# Patient Record
(demographics unavailable — no encounter records)

---

## 2024-10-16 NOTE — PLAN
[FreeTextEntry1] : Will prescribe an antibiotic and inhaled steroid/beta agonist combination.  The following OTC medications are recommended to treat URI/cold symptoms. Patient may use any or all of the following, as clinically indicated by symptoms, as long as not contraindicated by allergy or other medical conditions.  Neti-pot/nasal saline spray-  tsp salt dissolved in warm water in Neti Pot [place] (pelargonium)- treats cough/cold symptoms Sambucol (black elderberry syrup)- boosts immune system Nasal Steroid Spray (e.g. Fluticasone)- decreases nasal/sinus congestion; okay to use for weeks or months at a time Nasal decongestant spray (e.g. Afrin)- decreases nasal/sinus congestion but can only use short term (ie a few days at most) or else have side effects Decongestant (e.g. Sudafed)- decreases nasal/sinus congestion Antihistamine (e.g. Benadryl, Claritin etc.)- decreases runny nose and post nasal drip- NOT for use during acute sinus infections Mucolytic (e.g. Mucinex)- thins mucous to help it drain more easily Tylenol- for fever, pain, headache, aches etc. Ibuprofen (e.g. Advil, Motrin) or Naproxen (e.g. Aleve)- for fever, pain, headache, aches Fluids- thins mucous, keeps you hydrated Zinc- reduces duration and severity of cold symptoms Honey- 1 tsp. to 1 Tbsp. straight off spoon or mixed with tea or hot water- soothes sore throat and quiets cough

## 2024-10-16 NOTE — PHYSICAL EXAM
[No Lymphadenopathy] : no lymphadenopathy [No Respiratory Distress] : no respiratory distress  [No Accessory Muscle Use] : no accessory muscle use [No Edema] : there was no peripheral edema [No Focal Deficits] : no focal deficits [Normal] : affect was normal and insight and judgment were intact [Normal TMs] : both tympanic membranes were normal [de-identified] : diffuse wheezing, +rhonchi with cough

## 2024-10-16 NOTE — ASSESSMENT
[FreeTextEntry1] : He presents with a productive cough and fever. He is afebrile today but had a fever this morning.  His symptoms and exam are suggestive of bronchitis. The fact that he was improving and then worsened again suggests an initial viral cause with a bacterial superinfection.

## 2024-10-16 NOTE — HISTORY OF PRESENT ILLNESS
[FreeTextEntry8] : Patient presents with a fever to 102.4, productive cough and wheezing. He states that he tested negative for COVID and influenza [facility].  Symptoms started one week ago with a sore throat. He then developed a dry cough. On Sunday he developed a fever to 100 F. This went up to 101 on Monday and 102.4 yesterday. He developed wheezing and a productive cough yesterday. The sputum is green.   He has been taking Mucinex and Tessalon Perles. The doctor at [facility] recommended Flonase. They said his lungs were clear but there was fluid in his TMs.

## 2024-10-16 NOTE — HEALTH RISK ASSESSMENT
[No falls in past year] : Patient reported no falls in the past year [0] : 2) Feeling down, depressed, or hopeless: Not at all (0) [PHQ-2 Negative - No further assessment needed] : PHQ-2 Negative - No further assessment needed [Never] : Never [[id number]] : 0

## 2024-10-16 NOTE — REVIEW OF SYSTEMS
[Fever] : fever [Wheezing] : wheezing [Cough] : cough [Negative] : Heme/Lymph [FreeTextEntry4] : see HPI

## 2024-10-24 NOTE — DISCUSSION/SUMMARY
[Hypertension] : hypertension [Not Responding to Treatment] : not responding to treatment [Patient] : the patient [FreeTextEntry1] :  Pt will continue current medications. Pt will follow up in 4 months. ECG to evaluate for arrhythmia.  Labs were reviewed. Pt will follow up in 4 months. Echo to review MR, AI, and TR [EKG obtained to assist in diagnosis and management of assessed problem(s)] : EKG obtained to assist in diagnosis and management of assessed problem(s)

## 2024-10-24 NOTE — HISTORY OF PRESENT ILLNESS
[FreeTextEntry1] : 82 yo male presents for evaluation of blood pressure . Pt denies chest pain or shortness of breath. Blood pressure is reviewed and elevated. Other visits were reviewed..  Medications were reviewed.  H/O CAD, HLD. Recent labs were reviewed. PMD note was reviewed. No exertional problems

## 2024-10-24 NOTE — REASON FOR VISIT
[Symptom and Test Evaluation] : symptom and test evaluation [Follow-Up - Clinic] : a clinic follow-up of [Coronary Artery Disease] : coronary artery disease [Hyperlipidemia] : hyperlipidemia [Hypertension] : hypertension

## 2024-10-24 NOTE — PHYSICAL EXAM
[Well Developed] : well developed [Well Nourished] : well nourished [No Acute Distress] : no acute distress [Normal Venous Pressure] : normal venous pressure [No Carotid Bruit] : no carotid bruit [Normal S1, S2] : normal S1, S2 [No Murmur] : no murmur [No Rub] : no rub [No Gallop] : no gallop [Clear Lung Fields] : clear lung fields [Good Air Entry] : good air entry [No Respiratory Distress] : no respiratory distress  [Soft] : abdomen soft [Non Tender] : non-tender [No Masses/organomegaly] : no masses/organomegaly [Normal Bowel Sounds] : normal bowel sounds [Normal Gait] : normal gait [No Edema] : no edema [No Cyanosis] : no cyanosis [No Clubbing] : no clubbing [No Varicosities] : no varicosities [No Rash] : no rash [No Skin Lesions] : no skin lesions [Moves all extremities] : moves all extremities [No Focal Deficits] : no focal deficits [Normal Speech] : normal speech [Alert and Oriented] : alert and oriented [Normal memory] : normal memory [General Appearance - Well Developed] : well developed [Normal Appearance] : normal appearance [Well Groomed] : well groomed [General Appearance - Well Nourished] : well nourished [No Deformities] : no deformities [General Appearance - In No Acute Distress] : no acute distress [Normal Conjunctiva] : the conjunctiva exhibited no abnormalities [] : no respiratory distress [Respiration, Rhythm And Depth] : normal respiratory rhythm and effort [Exaggerated Use Of Accessory Muscles For Inspiration] : no accessory muscle use [Auscultation Breath Sounds / Voice Sounds] : lungs were clear to auscultation bilaterally [Heart Rate And Rhythm] : heart rate and rhythm were normal [Heart Sounds] : normal S1 and S2 [Abdomen Soft] : soft [Abnormal Walk] : normal gait [FreeTextEntry1] : No LE edema [Skin Color & Pigmentation] : normal skin color and pigmentation [Oriented To Time, Place, And Person] : oriented to person, place, and time

## 2024-11-20 NOTE — HEALTH RISK ASSESSMENT
[No falls in past year] : Patient reported no falls in the past year [0] : 2) Feeling down, depressed, or hopeless: Not at all (0) [PHQ-2 Negative - No further assessment needed] : PHQ-2 Negative - No further assessment needed [Never] : Never [[id number]] : 0

## 2024-11-20 NOTE — HISTORY OF PRESENT ILLNESS
[FreeTextEntry1] : ROSANA MARCOS is a 83 year old male here for a physical exam. [de-identified] : His last physical exam was last year  Vaccines: Tetanus is NOT up to date Pneumococcal vaccination is up to date per patient Shingrix is up to date  His last dentist visit was less than one year ago His last eye doctor appointment was less than one year ago His last dermatologist visit was less than one year ago, Dr. Cali  Colon cancer screening is no longer indicated due to age; last colonoscopy 1/2020  His diet is healthy overall Exercise: weights (dumbbells), minimal cardio

## 2024-11-20 NOTE — PLAN
[FreeTextEntry1] : Continue all medications as prescribed.  Reviewed age-appropriate preventive screening tests with patient. He had a flu shot this season.  Discussed clean eating (eg Mediterranean style eating plan) and regular exercise/staying as physically active as possible.  Include balance exercises and strength training and core strengthening exercises for bone health and to decrease risk for falls.  Reviewed importance of good self care (e.g. meditation, yoga, adequate rest, regular exercise, magnesium, clean eating, etc.).  Follow up for next physical in one year.

## 2024-11-20 NOTE — HEALTH RISK ASSESSMENT
[No falls in past year] : Patient reported no falls in the past year [0] : 2) Feeling down, depressed, or hopeless: Not at all (0) [PHQ-2 Negative - No further assessment needed] : PHQ-2 Negative - No further assessment needed [Never] : Never [[id number]] : 0

## 2024-11-20 NOTE — ASSESSMENT
[FreeTextEntry1] : ROSANA MARCOS is a 83 year old male here for a physical exam.  He has a history of diabetes, coronary artery disease, hypercholesterolemia, BPH, anxiety and depression.  He sees a cardiologist regularly and does not need an EKG today.  He sees Dr. Jimenez for diabetes management.  Labs were done prior. His CBC is within normal limits. His CMP is normal except for fasting glucose of 108 which is improved from last year. His HgbA1c is 6.8 which is slightly higher than last year. His cholesterol is also higher. His LDL is up to 79; it has been around 70 or lower for the past 3 years. He reports compliance with his diet and medication. He does not exercise much however.

## 2024-11-20 NOTE — HISTORY OF PRESENT ILLNESS
[FreeTextEntry1] : ROSANA MARCOS is a 83 year old male here for a physical exam. [de-identified] : His last physical exam was last year  Vaccines: Tetanus is NOT up to date Pneumococcal vaccination is up to date per patient Shingrix is up to date  His last dentist visit was less than one year ago His last eye doctor appointment was less than one year ago His last dermatologist visit was less than one year ago, Dr. Cali  Colon cancer screening is no longer indicated due to age; last colonoscopy 1/2020  His diet is healthy overall Exercise: weights (dumbbells), minimal cardio

## 2025-03-07 NOTE — HISTORY OF PRESENT ILLNESS
[FreeTextEntry1] : 84 yo male presents for evaluation of blood pressure . Pt denies chest pain or shortness of breath. Blood pressure is reviewed and elevated. Other visits were reviewed..  Medications were reviewed.  H/O CAD, HLD. Recent labs were reviewed. PMD note was reviewed. No exertional problems

## 2025-03-07 NOTE — DISCUSSION/SUMMARY
[Hypertension] : hypertension [Not Responding to Treatment] : not responding to treatment [Patient] : the patient [FreeTextEntry1] :  Pt will continue current medications. Pt will follow up in 4 months. ECG to evaluate for arrhythmia.  Labs were reviewed. Pt will follow up in 4 months. Echo reviewed.  [EKG obtained to assist in diagnosis and management of assessed problem(s)] : EKG obtained to assist in diagnosis and management of assessed problem(s)

## 2025-04-01 NOTE — REVIEW OF SYSTEMS
[Fever] : fever [Chills] : chills [Negative] : Psychiatric [FreeTextEntry7] : see HPI [FreeTextEntry9] : body aches

## 2025-04-01 NOTE — PHYSICAL EXAM
[Normal] : affect was normal and insight and judgment were intact [de-identified] : chills [de-identified] : hyperactive bowel sounds

## 2025-04-01 NOTE — PLAN
[FreeTextEntry1] : - rehydration with water and electrolyte drinks  - tylenol as needed  - if no improvement in 2-3 days, follow up. will do stool studies

## 2025-04-01 NOTE — HISTORY OF PRESENT ILLNESS
[FreeTextEntry8] : - last night developed chills, overnight had fever up to 102  - body aches, diarrhea  - denies URI symptoms  - had 2 bouts of diarrhea today  - denies blood or mucous in stool  - denies recent travel  - always eats out  - took tylenol in the morning, temp came down  - trying to hydrate - no recent abx or hospitalizations  - took Imodium before coming here today

## 2025-05-22 NOTE — REVIEW OF SYSTEMS
[Chills] : chills [see HPI] : see HPI [Anxiety] : anxiety [Depression] : depression [Feelings Of Weakness] : feelings of weakness [Negative] : Heme/Lymph [Fever] : no fever [Feeling Poorly] : not feeling poorly [FreeTextEntry2] : nausea

## 2025-05-22 NOTE — ASSESSMENT
[FreeTextEntry1] : 84 yo M with BPH on long term dutasteride, with past episode of post op urinary retention. Now doing well on dual therapy. Recommend continue. RTO in 1 year symptom check and PVR.  For renal cysts, discussed these are benign and follow up or treatment is not required.

## 2025-07-10 NOTE — HISTORY OF PRESENT ILLNESS
[FreeTextEntry1] : 85 yo male presents for evaluation of blood pressure . Pt denies chest pain or shortness of breath. Blood pressure is reviewed and elevated. Other visits were reviewed..  Medications were reviewed.  H/O CAD, HLD. Recent labs were reviewed. PMD note was reviewed. No exertional problems